# Patient Record
Sex: FEMALE | Race: WHITE | Employment: UNEMPLOYED | URBAN - METROPOLITAN AREA
[De-identification: names, ages, dates, MRNs, and addresses within clinical notes are randomized per-mention and may not be internally consistent; named-entity substitution may affect disease eponyms.]

---

## 2017-07-24 ENCOUNTER — APPOINTMENT (OUTPATIENT)
Dept: GENERAL RADIOLOGY | Age: 42
End: 2017-07-24
Attending: EMERGENCY MEDICINE
Payer: OTHER GOVERNMENT

## 2017-07-24 ENCOUNTER — HOSPITAL ENCOUNTER (OUTPATIENT)
Age: 42
Setting detail: OBSERVATION
Discharge: HOME | End: 2017-07-26
Attending: EMERGENCY MEDICINE | Admitting: HOSPITALIST
Payer: OTHER GOVERNMENT

## 2017-07-24 DIAGNOSIS — R07.2 PRECORDIAL PAIN: Primary | ICD-10-CM

## 2017-07-24 PROBLEM — I25.10 CAD (CORONARY ARTERY DISEASE): Status: ACTIVE | Noted: 2017-07-24

## 2017-07-24 PROBLEM — R07.9 CHEST PAIN: Status: ACTIVE | Noted: 2017-07-24

## 2017-07-24 LAB
ALBUMIN SERPL BCP-MCNC: 3.7 G/DL (ref 3.4–5)
ALBUMIN/GLOB SERPL: 1 {RATIO} (ref 0.8–1.7)
ALP SERPL-CCNC: 94 U/L (ref 45–117)
ALT SERPL-CCNC: 19 U/L (ref 13–56)
ANION GAP BLD CALC-SCNC: 11 MMOL/L (ref 3–18)
APPEARANCE UR: CLEAR
AST SERPL W P-5'-P-CCNC: 14 U/L (ref 15–37)
BASOPHILS # BLD AUTO: 0 K/UL (ref 0–0.06)
BASOPHILS # BLD: 0 % (ref 0–2)
BILIRUB SERPL-MCNC: 0.2 MG/DL (ref 0.2–1)
BILIRUB UR QL: NEGATIVE
BNP SERPL-MCNC: 49 PG/ML (ref 0–450)
BUN SERPL-MCNC: 8 MG/DL (ref 7–18)
BUN/CREAT SERPL: 11 (ref 12–20)
CALCIUM SERPL-MCNC: 8.7 MG/DL (ref 8.5–10.1)
CHLORIDE SERPL-SCNC: 102 MMOL/L (ref 100–108)
CK MB CFR SERPL CALC: NORMAL % (ref 0–4)
CK MB SERPL-MCNC: <1 NG/ML (ref 5–25)
CK SERPL-CCNC: 83 U/L (ref 26–192)
CO2 SERPL-SCNC: 25 MMOL/L (ref 21–32)
COLOR UR: YELLOW
CREAT SERPL-MCNC: 0.74 MG/DL (ref 0.6–1.3)
D DIMER PPP FEU-MCNC: <0.27 UG/ML(FEU)
DIFFERENTIAL METHOD BLD: ABNORMAL
EOSINOPHIL # BLD: 0.4 K/UL (ref 0–0.4)
EOSINOPHIL NFR BLD: 4 % (ref 0–5)
ERYTHROCYTE [DISTWIDTH] IN BLOOD BY AUTOMATED COUNT: 14.3 % (ref 11.6–14.5)
GLOBULIN SER CALC-MCNC: 3.6 G/DL (ref 2–4)
GLUCOSE SERPL-MCNC: 101 MG/DL (ref 74–99)
GLUCOSE UR STRIP.AUTO-MCNC: NEGATIVE MG/DL
HCT VFR BLD AUTO: 31.3 % (ref 35–45)
HGB BLD-MCNC: 10.2 G/DL (ref 12–16)
HGB UR QL STRIP: NEGATIVE
KETONES UR QL STRIP.AUTO: NEGATIVE MG/DL
LEUKOCYTE ESTERASE UR QL STRIP.AUTO: NEGATIVE
LIPASE SERPL-CCNC: 359 U/L (ref 73–393)
LYMPHOCYTES # BLD AUTO: 26 % (ref 21–52)
LYMPHOCYTES # BLD: 2.8 K/UL (ref 0.9–3.6)
MCH RBC QN AUTO: 27.9 PG (ref 24–34)
MCHC RBC AUTO-ENTMCNC: 32.6 G/DL (ref 31–37)
MCV RBC AUTO: 85.5 FL (ref 74–97)
MONOCYTES # BLD: 0.7 K/UL (ref 0.05–1.2)
MONOCYTES NFR BLD AUTO: 6 % (ref 3–10)
NEUTS SEG # BLD: 6.9 K/UL (ref 1.8–8)
NEUTS SEG NFR BLD AUTO: 64 % (ref 40–73)
NITRITE UR QL STRIP.AUTO: NEGATIVE
PH UR STRIP: 6 [PH] (ref 5–8)
PLATELET # BLD AUTO: 609 K/UL (ref 135–420)
PMV BLD AUTO: 9.3 FL (ref 9.2–11.8)
POTASSIUM SERPL-SCNC: 3.7 MMOL/L (ref 3.5–5.5)
PROT SERPL-MCNC: 7.3 G/DL (ref 6.4–8.2)
PROT UR STRIP-MCNC: NEGATIVE MG/DL
RBC # BLD AUTO: 3.66 M/UL (ref 4.2–5.3)
SODIUM SERPL-SCNC: 138 MMOL/L (ref 136–145)
SP GR UR REFRACTOMETRY: <1.005 (ref 1–1.03)
TROPONIN I BLD-MCNC: <0.04 NG/ML (ref 0–0.08)
TROPONIN I SERPL-MCNC: <0.02 NG/ML (ref 0–0.06)
UROBILINOGEN UR QL STRIP.AUTO: 0.2 EU/DL (ref 0.2–1)
WBC # BLD AUTO: 10.9 K/UL (ref 4.6–13.2)

## 2017-07-24 PROCEDURE — 82550 ASSAY OF CK (CPK): CPT | Performed by: EMERGENCY MEDICINE

## 2017-07-24 PROCEDURE — 83880 ASSAY OF NATRIURETIC PEPTIDE: CPT | Performed by: EMERGENCY MEDICINE

## 2017-07-24 PROCEDURE — 74011250636 HC RX REV CODE- 250/636: Performed by: EMERGENCY MEDICINE

## 2017-07-24 PROCEDURE — 84484 ASSAY OF TROPONIN QUANT: CPT | Performed by: EMERGENCY MEDICINE

## 2017-07-24 PROCEDURE — 99285 EMERGENCY DEPT VISIT HI MDM: CPT

## 2017-07-24 PROCEDURE — 80053 COMPREHEN METABOLIC PANEL: CPT | Performed by: EMERGENCY MEDICINE

## 2017-07-24 PROCEDURE — 83690 ASSAY OF LIPASE: CPT | Performed by: EMERGENCY MEDICINE

## 2017-07-24 PROCEDURE — 85379 FIBRIN DEGRADATION QUANT: CPT | Performed by: EMERGENCY MEDICINE

## 2017-07-24 PROCEDURE — 81025 URINE PREGNANCY TEST: CPT | Performed by: HOSPITALIST

## 2017-07-24 PROCEDURE — 81003 URINALYSIS AUTO W/O SCOPE: CPT | Performed by: EMERGENCY MEDICINE

## 2017-07-24 PROCEDURE — 71010 XR CHEST PORT: CPT

## 2017-07-24 PROCEDURE — 96374 THER/PROPH/DIAG INJ IV PUSH: CPT

## 2017-07-24 PROCEDURE — 93005 ELECTROCARDIOGRAM TRACING: CPT

## 2017-07-24 PROCEDURE — 80307 DRUG TEST PRSMV CHEM ANLYZR: CPT | Performed by: HOSPITALIST

## 2017-07-24 PROCEDURE — 74011250637 HC RX REV CODE- 250/637: Performed by: EMERGENCY MEDICINE

## 2017-07-24 PROCEDURE — 96372 THER/PROPH/DIAG INJ SC/IM: CPT

## 2017-07-24 PROCEDURE — 85025 COMPLETE CBC W/AUTO DIFF WBC: CPT | Performed by: EMERGENCY MEDICINE

## 2017-07-24 RX ORDER — NITROGLYCERIN 0.4 MG/1
0.4 TABLET SUBLINGUAL AS NEEDED
Status: DISCONTINUED | OUTPATIENT
Start: 2017-07-24 | End: 2017-07-26 | Stop reason: HOSPADM

## 2017-07-24 RX ORDER — GUAIFENESIN 100 MG/5ML
81 LIQUID (ML) ORAL DAILY
Status: DISCONTINUED | OUTPATIENT
Start: 2017-07-25 | End: 2017-07-26 | Stop reason: HOSPADM

## 2017-07-24 RX ORDER — HYDROXYZINE 25 MG/1
25 TABLET, FILM COATED ORAL ONCE
Status: COMPLETED | OUTPATIENT
Start: 2017-07-24 | End: 2017-07-24

## 2017-07-24 RX ORDER — ATORVASTATIN CALCIUM 20 MG/1
40 TABLET, FILM COATED ORAL DAILY
Status: DISCONTINUED | OUTPATIENT
Start: 2017-07-25 | End: 2017-07-26 | Stop reason: HOSPADM

## 2017-07-24 RX ORDER — CLOPIDOGREL BISULFATE 75 MG/1
75 TABLET ORAL DAILY
COMMUNITY

## 2017-07-24 RX ORDER — LEVOTHYROXINE SODIUM 125 UG/1
125 TABLET ORAL
COMMUNITY

## 2017-07-24 RX ORDER — SODIUM CHLORIDE 0.9 % (FLUSH) 0.9 %
5-10 SYRINGE (ML) INJECTION EVERY 8 HOURS
Status: DISCONTINUED | OUTPATIENT
Start: 2017-07-24 | End: 2017-07-26 | Stop reason: HOSPADM

## 2017-07-24 RX ORDER — ATORVASTATIN CALCIUM 40 MG/1
40 TABLET, FILM COATED ORAL DAILY
COMMUNITY

## 2017-07-24 RX ORDER — DILTIAZEM HYDROCHLORIDE 240 MG/1
240 CAPSULE, COATED, EXTENDED RELEASE ORAL DAILY
Status: DISCONTINUED | OUTPATIENT
Start: 2017-07-25 | End: 2017-07-26 | Stop reason: HOSPADM

## 2017-07-24 RX ORDER — HYDROXYZINE 25 MG/1
25 TABLET, FILM COATED ORAL
Status: DISCONTINUED | OUTPATIENT
Start: 2017-07-24 | End: 2017-07-26 | Stop reason: HOSPADM

## 2017-07-24 RX ORDER — ALBUTEROL SULFATE 90 UG/1
2 AEROSOL, METERED RESPIRATORY (INHALATION)
Status: DISCONTINUED | OUTPATIENT
Start: 2017-07-24 | End: 2017-07-26 | Stop reason: HOSPADM

## 2017-07-24 RX ORDER — BUPROPION HYDROCHLORIDE 100 MG/1
200 TABLET, EXTENDED RELEASE ORAL DAILY
Status: DISCONTINUED | OUTPATIENT
Start: 2017-07-25 | End: 2017-07-26 | Stop reason: HOSPADM

## 2017-07-24 RX ORDER — GUAIFENESIN 100 MG/5ML
81 LIQUID (ML) ORAL DAILY
COMMUNITY

## 2017-07-24 RX ORDER — FENTANYL CITRATE 50 UG/ML
100 INJECTION, SOLUTION INTRAMUSCULAR; INTRAVENOUS ONCE
Status: COMPLETED | OUTPATIENT
Start: 2017-07-24 | End: 2017-07-24

## 2017-07-24 RX ORDER — DILTIAZEM HYDROCHLORIDE 240 MG/1
240 CAPSULE, EXTENDED RELEASE ORAL DAILY
COMMUNITY

## 2017-07-24 RX ORDER — SODIUM CHLORIDE 0.9 % (FLUSH) 0.9 %
5-10 SYRINGE (ML) INJECTION AS NEEDED
Status: DISCONTINUED | OUTPATIENT
Start: 2017-07-24 | End: 2017-07-26 | Stop reason: HOSPADM

## 2017-07-24 RX ORDER — ENOXAPARIN SODIUM 100 MG/ML
70 INJECTION SUBCUTANEOUS EVERY 12 HOURS
Status: DISCONTINUED | OUTPATIENT
Start: 2017-07-24 | End: 2017-07-25

## 2017-07-24 RX ORDER — ALBUTEROL SULFATE 90 UG/1
2 AEROSOL, METERED RESPIRATORY (INHALATION)
COMMUNITY

## 2017-07-24 RX ORDER — LEVOTHYROXINE SODIUM 125 UG/1
125 TABLET ORAL
Status: DISCONTINUED | OUTPATIENT
Start: 2017-07-25 | End: 2017-07-26 | Stop reason: HOSPADM

## 2017-07-24 RX ORDER — FLUTICASONE PROPIONATE AND SALMETEROL 500; 50 UG/1; UG/1
1 POWDER RESPIRATORY (INHALATION) EVERY 12 HOURS
COMMUNITY
End: 2017-07-26

## 2017-07-24 RX ORDER — FLUTICASONE FUROATE AND VILANTEROL 100; 25 UG/1; UG/1
1 POWDER RESPIRATORY (INHALATION) DAILY
Status: DISCONTINUED | OUTPATIENT
Start: 2017-07-25 | End: 2017-07-26 | Stop reason: HOSPADM

## 2017-07-24 RX ORDER — BUPROPION HYDROCHLORIDE 200 MG/1
200 TABLET, EXTENDED RELEASE ORAL DAILY
COMMUNITY

## 2017-07-24 RX ORDER — HYDROXYZINE PAMOATE 25 MG/1
25 CAPSULE ORAL
COMMUNITY

## 2017-07-24 RX ORDER — MORPHINE SULFATE 2 MG/ML
1 INJECTION, SOLUTION INTRAMUSCULAR; INTRAVENOUS
Status: DISCONTINUED | OUTPATIENT
Start: 2017-07-24 | End: 2017-07-26 | Stop reason: HOSPADM

## 2017-07-24 RX ORDER — FLUTICASONE PROPIONATE AND SALMETEROL 100; 50 UG/1; UG/1
1 POWDER RESPIRATORY (INHALATION) EVERY 12 HOURS
COMMUNITY

## 2017-07-24 RX ORDER — CLOPIDOGREL BISULFATE 75 MG/1
75 TABLET ORAL DAILY
Status: DISCONTINUED | OUTPATIENT
Start: 2017-07-25 | End: 2017-07-26 | Stop reason: HOSPADM

## 2017-07-24 RX ADMIN — HYDROXYZINE HYDROCHLORIDE 25 MG: 25 TABLET, FILM COATED ORAL at 22:18

## 2017-07-24 RX ADMIN — ENOXAPARIN SODIUM 70 MG: 80 INJECTION SUBCUTANEOUS at 23:42

## 2017-07-24 RX ADMIN — NITROGLYCERIN 1 INCH: 20 OINTMENT TOPICAL at 23:43

## 2017-07-24 RX ADMIN — FENTANYL CITRATE 100 MCG: 50 INJECTION, SOLUTION INTRAMUSCULAR; INTRAVENOUS at 21:17

## 2017-07-24 NOTE — IP AVS SNAPSHOT
Summary of Care Report The Summary of Care report has been created to help improve care coordination. Users with access to just.me or [address] (Web-based application) may access additional patient information including the Discharge Summary. If you are not currently a [address] user and need more information, please call the number listed below in the [place] [address] section and ask to be connected with Medical Records. Facility Information Name Address Phone [address] [address] [phone] Patient Information Patient Name Sex  Dar Laurent ([id number]) Female 1975 Discharge Information Admitting Provider Service Area Unit Russel Rhodes MD / [id number] Card/Med / [phone] Discharge Provider Discharge Date/Time Discharge Disposition Destination (none) 2017 (Pending) AHR (none) Patient Language Language ENGLISH [13] Hospital Problems as of 2017  Never Reviewed Class Noted - Resolved Last Modified POA Active Problems Chest pain  2017 - Present 2017 by Florectia Serrano MD Unknown Entered by Florecita Serrano MD  
  CAD (coronary artery disease)  2017 - Present 2017 by Russel Rhodes MD Unknown Entered by Russel Rhodes MD  
  Anxiety  2017 - Present 2017 by Russel Rhodes MD Unknown Entered by Russel Rhodes MD  
  COPD (chronic obstructive pulmonary disease) ([facility] [address]  2017 - Present 2017 by Russel Rhodes MD Unknown Entered by Russel Rhodes MD  
  Anemia  2017 - Present 2017 by Russel Rhodes MD Unknown Entered by Russel Rhodes MD  
  
You are allergic to the following Allergen Reactions Lidocaine Hives Lopressor (Metoprolol Tartrate) Hives Pcn (Penicillins) Hives Current Discharge Medication List  
 CONTINUE these medications which have CHANGED Dose & Instructions Dispensing Information Comments ADVAIR DISKUS 100-50 mcg/dose diskus inhaler Generic drug:  fluticasone-salmeterol What changed:  Another medication with the same name was removed. Continue taking this medication, and follow the directions you see here. Dose:  1 Puff Take 1 Puff by inhalation every twelve (12) hours. Refills:  0 CONTINUE these medications which have NOT CHANGED Dose & Instructions Dispensing Information Comments AMBIEN 10 mg tablet Generic drug:  zolpidem Dose:  10 mg Take 10 mg by mouth nightly as needed for Sleep. Refills:  0  
   
 aspirin 81 mg chewable tablet Dose:  81 mg Take 81 mg by mouth daily. Indications: pt took 3 asa pta of ems this pm  
 Refills:  0  
   
 atorvastatin 40 mg tablet Commonly known as:  LIPITOR Dose:  40 mg Take 40 mg by mouth daily. Refills:  0  
   
 dilTIAZem  mg XR capsule Commonly known as:  DILACOR XR Dose:  240 mg Take 240 mg by mouth daily. Refills:  0 PLAVIX 75 mg Tab Generic drug:  clopidogrel Dose:  75 mg Take 75 mg by mouth daily. Refills:  0 PROAIR HFA 90 mcg/actuation inhaler Generic drug:  albuterol Dose:  2 Puff Take 2 Puffs by inhalation every four (4) hours as needed for Wheezing. Refills:  0  
   
 SYNTHROID 125 mcg tablet Generic drug:  levothyroxine Dose:  125 mcg Take 125 mcg by mouth Daily (before breakfast). Refills:  0  
   
 VISTARIL 25 mg capsule Generic drug:  hydrOXYzine pamoate Dose:  25 mg Take 25 mg by mouth three (3) times daily as needed for Itching. Refills:  0 WELLBUTRIN  mg SR tablet Generic drug:  buPROPion SR Dose:  200 mg Take 200 mg by mouth daily. Refills:  0 Follow-up Information Follow up With Details Comments Contact Info Patient prefers to schedule her own follow-up appointments. Stephon Lombardo MD In 1 day  Patient can only remember the practice name and not the physician 
  
 primary cardiology  In 3 days Discharge Instructions DISCHARGE SUMMARY from Nurse The following personal items are in your possession at time of discharge: 
 
Dental Appliances: None Visual Aid: None Home Medications: None Jewelry: Ring, With patient Clothing: Shirt, Pants, Footwear Other Valuables: Cell Phone Personal Items Sent to Safe: no PATIENT INSTRUCTIONS: 
 
What to do at Home: 
 
Diet: Heart Healthy (Cardiac diet) Recommended activity: Activity as tolerated 
 
 please follow up with primary care physician and Cardiology. *  Please give a list of your current medications to your Primary Care Provider. *  Please update this list whenever your medications are discontinued, doses are 
    changed, or new medications (including over-the-counter products) are added. *  Please carry medication information at all times in case of emergency situations. These are general instructions for a healthy lifestyle: No smoking/ No tobacco products/ Avoid exposure to second hand smoke Surgeon General's Warning:  Quitting smoking now greatly reduces serious risk to your health. Obesity, smoking, and sedentary lifestyle greatly increases your risk for illness A healthy diet, regular physical exercise & weight monitoring are important for maintaining a healthy lifestyle You may be retaining fluid if you have a history of heart failure or if you experience any of the following symptoms:  Weight gain of 3 pounds or more overnight or 5 pounds in a week, increased swelling in our hands or feet or shortness of breath while lying flat in bed. Please call your doctor as soon as you notice any of these symptoms; do not wait until your next office visit.  
 
Recognize signs and symptoms of STROKE: 
 
 F-face looks uneven A-arms unable to move or move unevenly S-speech slurred or non-existent T-time-call 911 as soon as signs and symptoms begin-DO NOT go Back to bed or wait to see if you get better-TIME IS BRAIN. Warning Signs of HEART ATTACK Call 911 if you have these symptoms: 
? Chest discomfort. Most heart attacks involve discomfort in the center of the chest that lasts more than a few minutes, or that goes away and comes back. It can feel like uncomfortable pressure, squeezing, fullness, or pain. ? Discomfort in other areas of the upper body. Symptoms can include pain or discomfort in one or both arms, the back, neck, jaw, or stomach. ? Shortness of breath with or without chest discomfort. ? Other signs may include breaking out in a cold sweat, nausea, or lightheadedness. Don't wait more than five minutes to call [address]! Fast action can save your life. Calling 911 is almost always the fastest way to get lifesaving treatment. Emergency Medical Services staff can begin treatment when they arrive  up to an hour sooner than if someone gets to the hospital by car. The discharge information has been reviewed with the patient. The patient verbalized understanding. Discharge medications reviewed with the patient and appropriate educational materials and side effects teaching were provided. Patient armband removed and shredded Chart Review Routing History No Routing History on File

## 2017-07-24 NOTE — IP AVS SNAPSHOT
Kearajar[facility] Allenruddy 
 
 
 [address] [facility] [address] 
[phone] Patient: Leatha Gamboa MRN: [id number] :1975 You are allergic to the following Allergen Reactions Lidocaine Hives Lopressor (Metoprolol Tartrate) Hives Pcn (Penicillins) Hives Recent Documentation Height Weight BMI Smoking Status 1.63 m 72 kg 27.1 kg/m2 Current Every Day Smoker Emergency Contacts Name Discharge Info Relation Home Work Mobile Jeff Ga DISCHARGE CAREGIVER [3] Spouse [3]   [phone] About your hospitalization You were admitted on:  2017 You last received care in the:  [address] [place] [address] You were discharged on:  2017 Unit phone number:  [phone] Why you were hospitalized Your primary diagnosis was:  Not on File Your diagnoses also included:  Chest Pain, Cad (Coronary Artery Disease), Anxiety, Copd (Chronic Obstructive Pulmonary Disease) ([facility]), Anemia Providers Seen During Your Hospitalizations Provider Role Specialty Primary office phone Nicki Goode MD Attending Provider Emergency Medicine [phone] Ba Navarro MD Attending Provider Internal Medicine [phone] Your Primary Care Physician (PCP) Primary Care Physician Office Phone Office Fax OTHER, PHYS ** None ** ** None ** Follow-up Information Follow up With Details Comments Contact Info Patient prefers to schedule her own follow-up appointments. Stephon Lombardo MD In 1 day  Patient can only remember the practice name and not the physician 
  
 primary cardiology  In 3 days Current Discharge Medication List  
  
CONTINUE these medications which have CHANGED Dose & Instructions Dispensing Information Comments Morning Noon Evening Bedtime ADVAIR DISKUS 100-50 mcg/dose diskus inhaler Generic drug:  fluticasone-salmeterol What changed:  Another medication with the same name was removed. Continue taking this medication, and follow the directions you see here. Your last dose was: Your next dose is:    
   
   
 Dose:  1 Puff Take 1 Puff by inhalation every twelve (12) hours. Refills:  0 CONTINUE these medications which have NOT CHANGED Dose & Instructions Dispensing Information Comments Morning Noon Evening Bedtime AMBIEN 10 mg tablet Generic drug:  zolpidem Your last dose was: Your next dose is:    
   
   
 Dose:  10 mg Take 10 mg by mouth nightly as needed for Sleep. Refills:  0  
     
   
   
   
  
 aspirin 81 mg chewable tablet Your last dose was: Your next dose is:    
   
   
 Dose:  81 mg Take 81 mg by mouth daily. Indications: pt took 3 asa pta of ems this pm  
 Refills:  0  
     
   
   
   
  
 atorvastatin 40 mg tablet Commonly known as:  LIPITOR Your last dose was: Your next dose is:    
   
   
 Dose:  40 mg Take 40 mg by mouth daily. Refills:  0  
     
   
   
   
  
 dilTIAZem  mg XR capsule Commonly known as:  DILACOR XR Your last dose was: Your next dose is:    
   
   
 Dose:  240 mg Take 240 mg by mouth daily. Refills:  0 PLAVIX 75 mg Tab Generic drug:  clopidogrel Your last dose was: Your next dose is:    
   
   
 Dose:  75 mg Take 75 mg by mouth daily. Refills:  0 PROAIR HFA 90 mcg/actuation inhaler Generic drug:  albuterol Your last dose was: Your next dose is:    
   
   
 Dose:  2 Puff Take 2 Puffs by inhalation every four (4) hours as needed for Wheezing. Refills:  0  
     
   
   
   
  
 SYNTHROID 125 mcg tablet Generic drug:  levothyroxine Your last dose was: Your next dose is:    
   
   
 Dose:  125 mcg Take 125 mcg by mouth Daily (before breakfast). Refills:  0  
     
   
   
   
  
 VISTARIL 25 mg capsule Generic drug:  hydrOXYzine pamoate Your last dose was: Your next dose is:    
   
   
 Dose:  25 mg Take 25 mg by mouth three (3) times daily as needed for Itching. Refills:  0 WELLBUTRIN  mg SR tablet Generic drug:  buPROPion SR Your last dose was: Your next dose is:    
   
   
 Dose:  200 mg Take 200 mg by mouth daily. Refills:  0 Discharge Instructions DISCHARGE SUMMARY from Nurse The following personal items are in your possession at time of discharge: 
 
Dental Appliances: None Visual Aid: None Home Medications: None Jewelry: Ring, With patient Clothing: Shirt, Pants, Footwear Other Valuables: Cell Phone Personal Items Sent to Safe: no PATIENT INSTRUCTIONS: 
 
What to do at Home: 
 
Diet: Heart Healthy (Cardiac diet) Recommended activity: Activity as tolerated 
 
 please follow up with primary care physician and Cardiology. *  Please give a list of your current medications to your Primary Care Provider. *  Please update this list whenever your medications are discontinued, doses are 
    changed, or new medications (including over-the-counter products) are added. *  Please carry medication information at all times in case of emergency situations. These are general instructions for a healthy lifestyle: No smoking/ No tobacco products/ Avoid exposure to second hand smoke Surgeon General's Warning:  Quitting smoking now greatly reduces serious risk to your health. Obesity, smoking, and sedentary lifestyle greatly increases your risk for illness A healthy diet, regular physical exercise & weight monitoring are important for maintaining a healthy lifestyle You may be retaining fluid if you have a history of heart failure or if you experience any of the following symptoms:  Weight gain of 3 pounds or more overnight or 5 pounds in a week, increased swelling in our hands or feet or shortness of breath while lying flat in bed. Please call your doctor as soon as you notice any of these symptoms; do not wait until your next office visit. Recognize signs and symptoms of STROKE: 
 
F-face looks uneven A-arms unable to move or move unevenly S-speech slurred or non-existent T-time-call 911 as soon as signs and symptoms begin-DO NOT go Back to bed or wait to see if you get better-TIME IS BRAIN. Warning Signs of HEART ATTACK Call 911 if you have these symptoms: 
? Chest discomfort. Most heart attacks involve discomfort in the center of the chest that lasts more than a few minutes, or that goes away and comes back. It can feel like uncomfortable pressure, squeezing, fullness, or pain. ? Discomfort in other areas of the upper body. Symptoms can include pain or discomfort in one or both arms, the back, neck, jaw, or stomach. ? Shortness of breath with or without chest discomfort. ? Other signs may include breaking out in a cold sweat, nausea, or lightheadedness. Don't wait more than five minutes to call [address]! Fast action can save your life. Calling 911 is almost always the fastest way to get lifesaving treatment. Emergency Medical Services staff can begin treatment when they arrive  up to an hour sooner than if someone gets to the hospital by car. The discharge information has been reviewed with the patient. The patient verbalized understanding. Discharge medications reviewed with the patient and appropriate educational materials and side effects teaching were provided. Patient armband removed and shredded Discharge Orders None MyChart Announcement  We are excited to announce that we are making your provider's discharge notes available to you in [facility]. You will see these notes when they are completed and signed by the physician that discharged you from your recent hospital stay. If you have any questions or concerns about any information you see in [facility], please call the Health Information Department where you were seen or reach out to your Primary Care Provider for more information about your plan of care. Introducing [facility]! Juli Cooney introduces [facility] patient portal. Now you can access parts of your medical record, email your doctor's office, and request medication refills online. 1. In your internet browser, go to [email or web address] 2. Click on the First Time User? Click Here link in the Sign In box. You will see the New Member Sign Up page. 3. Enter your [facility] Access Code exactly as it appears below. You will not need to use this code after youve completed the sign-up process. If you do not sign up before the expiration date, you must request a new code. · [facility] Access Code: [email or web address] Expires: 10/24/2017 11:06 AM 
 
4. Enter the last four digits of your Social Security Number (xxxx) and Date of Birth (mm/dd/yyyy) as indicated and click Submit. You will be taken to the next sign-up page. 5. Create a [facility] ID. This will be your [facility] login ID and cannot be changed, so think of one that is secure and easy to remember. 6. Create a [facility] password. You can change your password at any time. 7. Enter your Password Reset Question and Answer. This can be used at a later time if you forget your password. 8. Enter your e-mail address. You will receive e-mail notification when new information is available in [address] 9. Click Sign Up. You can now view and download portions of your medical record. 10. Click the Download Summary menu link to download a portable copy of your medical information. If you have questions, please visit the Frequently Asked Questions section of the MyChart website. Remember, MyChart is NOT to be used for urgent needs. For medical emergencies, dial 911. Now available from your iPhone and Android! General Information Please provide this summary of care documentation to your next provider. Patient Signature:  ____________________________________________________________ Date:  ____________________________________________________________  
  
Luis Noe Provider Signature:  ____________________________________________________________ Date:  ____________________________________________________________

## 2017-07-25 PROBLEM — D64.9 ANEMIA: Status: ACTIVE | Noted: 2017-07-25

## 2017-07-25 PROBLEM — F41.9 ANXIETY: Status: ACTIVE | Noted: 2017-07-25

## 2017-07-25 PROBLEM — J44.9 COPD (CHRONIC OBSTRUCTIVE PULMONARY DISEASE) (HCC): Status: ACTIVE | Noted: 2017-07-25

## 2017-07-25 LAB
AMPHET UR QL SCN: NEGATIVE
BARBITURATES UR QL SCN: NEGATIVE
BENZODIAZ UR QL: NEGATIVE
CANNABINOIDS UR QL SCN: NEGATIVE
CK MB CFR SERPL CALC: NORMAL % (ref 0–4)
CK MB SERPL-MCNC: <1 NG/ML (ref 5–25)
CK SERPL-CCNC: 64 U/L (ref 26–192)
CK SERPL-CCNC: 66 U/L (ref 26–192)
CK SERPL-CCNC: 76 U/L (ref 26–192)
COCAINE UR QL SCN: NEGATIVE
HCG UR QL: NEGATIVE
HDSCOM,HDSCOM: ABNORMAL
METHADONE UR QL: NEGATIVE
OPIATES UR QL: POSITIVE
PCP UR QL: NEGATIVE
TROPONIN I SERPL-MCNC: <0.02 NG/ML (ref 0–0.06)

## 2017-07-25 PROCEDURE — 93306 TTE W/DOPPLER COMPLETE: CPT

## 2017-07-25 PROCEDURE — 74011250637 HC RX REV CODE- 250/637: Performed by: HOSPITALIST

## 2017-07-25 PROCEDURE — 36415 COLL VENOUS BLD VENIPUNCTURE: CPT | Performed by: HOSPITALIST

## 2017-07-25 PROCEDURE — 96376 TX/PRO/DX INJ SAME DRUG ADON: CPT

## 2017-07-25 PROCEDURE — 74011250637 HC RX REV CODE- 250/637: Performed by: INTERNAL MEDICINE

## 2017-07-25 PROCEDURE — 96375 TX/PRO/DX INJ NEW DRUG ADDON: CPT

## 2017-07-25 PROCEDURE — 74011250636 HC RX REV CODE- 250/636: Performed by: HOSPITALIST

## 2017-07-25 PROCEDURE — 99218 HC RM OBSERVATION: CPT

## 2017-07-25 PROCEDURE — 82550 ASSAY OF CK (CPK): CPT | Performed by: HOSPITALIST

## 2017-07-25 PROCEDURE — 74011250636 HC RX REV CODE- 250/636: Performed by: EMERGENCY MEDICINE

## 2017-07-25 RX ORDER — PANTOPRAZOLE SODIUM 40 MG/1
40 TABLET, DELAYED RELEASE ORAL
Status: DISCONTINUED | OUTPATIENT
Start: 2017-07-25 | End: 2017-07-26 | Stop reason: HOSPADM

## 2017-07-25 RX ORDER — ONDANSETRON 2 MG/ML
INJECTION INTRAMUSCULAR; INTRAVENOUS
Status: DISPENSED
Start: 2017-07-25 | End: 2017-07-25

## 2017-07-25 RX ORDER — ZOLPIDEM TARTRATE 5 MG/1
10 TABLET ORAL
Status: DISCONTINUED | OUTPATIENT
Start: 2017-07-25 | End: 2017-07-26 | Stop reason: HOSPADM

## 2017-07-25 RX ORDER — MORPHINE SULFATE 10 MG/ML
8 INJECTION, SOLUTION INTRAMUSCULAR; INTRAVENOUS ONCE
Status: ACTIVE | OUTPATIENT
Start: 2017-07-25 | End: 2017-07-25

## 2017-07-25 RX ORDER — ACETAMINOPHEN 325 MG/1
650 TABLET ORAL
Status: DISCONTINUED | OUTPATIENT
Start: 2017-07-25 | End: 2017-07-26 | Stop reason: HOSPADM

## 2017-07-25 RX ORDER — ZOLPIDEM TARTRATE 10 MG/1
10 TABLET ORAL
COMMUNITY

## 2017-07-25 RX ORDER — ONDANSETRON 2 MG/ML
4 INJECTION INTRAMUSCULAR; INTRAVENOUS
Status: DISCONTINUED | OUTPATIENT
Start: 2017-07-25 | End: 2017-07-26 | Stop reason: HOSPADM

## 2017-07-25 RX ORDER — ONDANSETRON 2 MG/ML
4 INJECTION INTRAMUSCULAR; INTRAVENOUS ONCE
Status: COMPLETED | OUTPATIENT
Start: 2017-07-25 | End: 2017-07-25

## 2017-07-25 RX ADMIN — LEVOTHYROXINE SODIUM 125 MCG: 125 TABLET ORAL at 08:49

## 2017-07-25 RX ADMIN — MORPHINE SULFATE 1 MG: 2 INJECTION, SOLUTION INTRAMUSCULAR; INTRAVENOUS at 11:38

## 2017-07-25 RX ADMIN — CLOPIDOGREL BISULFATE 75 MG: 75 TABLET, FILM COATED ORAL at 08:48

## 2017-07-25 RX ADMIN — MORPHINE SULFATE 1 MG: 2 INJECTION, SOLUTION INTRAMUSCULAR; INTRAVENOUS at 00:52

## 2017-07-25 RX ADMIN — MORPHINE SULFATE 1 MG: 2 INJECTION, SOLUTION INTRAMUSCULAR; INTRAVENOUS at 08:29

## 2017-07-25 RX ADMIN — PANTOPRAZOLE SODIUM 40 MG: 40 TABLET, DELAYED RELEASE ORAL at 08:48

## 2017-07-25 RX ADMIN — ONDANSETRON 4 MG: 2 INJECTION INTRAMUSCULAR; INTRAVENOUS at 08:29

## 2017-07-25 RX ADMIN — Medication 10 ML: at 01:08

## 2017-07-25 RX ADMIN — FLUTICASONE FUROATE AND VILANTEROL TRIFENATATE 1 PUFF: 100; 25 POWDER RESPIRATORY (INHALATION) at 11:40

## 2017-07-25 RX ADMIN — BUPROPION HYDROCHLORIDE 200 MG: 100 TABLET, FILM COATED, EXTENDED RELEASE ORAL at 08:48

## 2017-07-25 RX ADMIN — MORPHINE SULFATE 1 MG: 2 INJECTION, SOLUTION INTRAMUSCULAR; INTRAVENOUS at 02:47

## 2017-07-25 RX ADMIN — ZOLPIDEM TARTRATE 10 MG: 5 TABLET ORAL at 21:04

## 2017-07-25 RX ADMIN — ATORVASTATIN CALCIUM 40 MG: 20 TABLET, FILM COATED ORAL at 21:04

## 2017-07-25 RX ADMIN — Medication 10 ML: at 21:04

## 2017-07-25 RX ADMIN — ACETAMINOPHEN 650 MG: 325 TABLET ORAL at 18:34

## 2017-07-25 RX ADMIN — ONDANSETRON 4 MG: 2 INJECTION INTRAMUSCULAR; INTRAVENOUS at 01:06

## 2017-07-25 RX ADMIN — ASPIRIN 81 MG 81 MG: 81 TABLET ORAL at 08:49

## 2017-07-25 RX ADMIN — DILTIAZEM HYDROCHLORIDE 240 MG: 240 CAPSULE, EXTENDED RELEASE ORAL at 08:48

## 2017-07-25 NOTE — H&P
History & Physical    Patient: Satish Freeman MRN: [id number]  CSN: [id number]    YOB: 1975  Age: 43 y.o. Sex: female      DOA: 7/24/2017  Primary Care Provider:  Stephon Lombardo MD      Assessment/Plan     Patient Active Problem List   Diagnosis Code    Chest pain R07.9    CAD (coronary artery disease) I25.10    Anxiety F41.9    COPD (chronic obstructive pulmonary disease) ([facility] [address] J44.9       Admit to tele for obs     1chest pain   Need to admit for obs to r/o acs   Dr. Evy Zambrano called per Dr. Calvin Bennett   Echo   Nuclear stress test  In April   lovenox and nitro paste   Morphine and O2 NC   2. Cad   Continue plavix and aspirin   Ca blocker   3. Copd stable   Continue breathing tx prn  4. Anxiety  Continue home meds   5 smoker   Refused nicotine patch   6 anemia   H/h 10/31.2   No transfusion needed      DVT : lovenox. ppi proph  CC: chest pain        HPI:     Satish Freeman is a 43 y.o. female who hx of cad with stent, anxiety, copd, smoker came to ed due to chest pain today. Started while lying on the bed, in the middle of chest, no radiation, taking nitro 2 times, not improved. Her ce wnl. ekg sr. She was given fentanyl for pain and pain improved. Dr. Evy Zambrano was called and recommend admit for obs. Denies any slurred speech/headache/n/v/blurred vission/d/c/palpitation/gait change/bleeding. She is a  Smoker. Visit Vitals    /71    Pulse 66    Temp 98.4 °F (36.9 °C)    Resp 18    Ht 5' 4.17\" (1.63 m)    Wt 72 kg (158 lb 11.7 oz)    SpO2 100%    BMI 27.1 kg/m2    O2 Flow Rate (L/min): 2 l/min O2 Device: Nasal cannula      Past Medical History:   Diagnosis Date    CAD (coronary artery disease)     Hypertension        Past Surgical History:   Procedure Laterality Date    CARDIAC SURG PROCEDURE UNLIST      STEMI with stents 2016       History reviewed. No pertinent family history.     Social History     Social History    Marital status:      Spouse name: N/A    Number of children: N/A    Years of education: N/A     Social History Main Topics    Smoking status: Current Every Day Smoker    Smokeless tobacco: None    Alcohol use None    Drug use: None    Sexual activity: Not Asked     Other Topics Concern    None     Social History Narrative    None       Prior to Admission medications    Medication Sig Start Date End Date Taking? Authorizing Provider   clopidogrel (PLAVIX) 75 mg tab Take 75 mg by mouth daily. Yes Stephon Lombardo MD   aspirin 81 mg chewable tablet Take 81 mg by mouth daily. Indications: pt took 3 asa pta of ems this pm   Yes Stephon Lombardo MD   atorvastatin (LIPITOR) 40 mg tablet Take 40 mg by mouth daily. Yes Stephon Lombardo MD   fluticasone-salmeterol (ADVAIR DISKUS) 100-50 mcg/dose diskus inhaler Take 1 Puff by inhalation every twelve (12) hours. Yes Stephon Lombardo MD   fluticasone-salmeterol (ADVAIR DISKUS) 500-50 mcg/dose diskus inhaler Take 1 Puff by inhalation every twelve (12) hours. Yes Stephon Lombardo MD   albuterol (PROAIR HFA) 90 mcg/actuation inhaler Take 2 Puffs by inhalation every four (4) hours as needed for Wheezing. Yes Stephon Lombardo MD   levothyroxine (SYNTHROID) 125 mcg tablet Take 125 mcg by mouth Daily (before breakfast). Yes Stephon Lombardo MD   dilTIAZem XR (DILACOR XR) 240 mg XR capsule Take 240 mg by mouth daily. Yes Stephon Lombardo MD   buPROPion SR (WELLBUTRIN SR) 200 mg SR tablet Take 200 mg by mouth daily. Yes Stephon Lombardo MD   hydrOXYzine pamoate (VISTARIL) 25 mg capsule Take 25 mg by mouth three (3) times daily as needed for Itching. Yes Stephon Lombardo MD       Allergies   Allergen Reactions    Lidocaine Hives    Lopressor [Metoprolol Tartrate] Hives    Pcn [Penicillins] Hives       Review of Systems  Gen: No fever, chills, malaise, weight loss/gain. Heent: No headache, rhinorrhea, epistaxis, ear pain, hearing loss, sinus pain, neck pain/stiffness, sore throat. Heart: chest pain, no palpitations, PEDROZA, pnd, or orthopnea.    Resp: No cough, hemoptysis, wheezing and shortness of breath. GI: No nausea, vomiting, diarrhea, constipation, melena or hematochezia. : No urinary obstruction, dysuria or hematuria. Derm: No rash, new skin lesion or pruritis. Musc/skeletal: no bone or joint complains. Vasc: No edema, cyanosis or claudication. Endo: No heat/cold intolerance, no polyuria,polydipsia or polyphagia. Neuro: No unilateral weakness, numbness, tingling. No seizures. Heme: No easy bruising or bleeding. Physical Exam:     Physical Exam:  Visit Vitals    /71    Pulse 66    Temp 98.4 °F (36.9 °C)    Resp 18    Ht 5' 4.17\" (1.63 m)    Wt 72 kg (158 lb 11.7 oz)    SpO2 100%    BMI 27.1 kg/m2    O2 Flow Rate (L/min): 2 l/min O2 Device: Nasal cannula    Temp (24hrs), Av.5 °F (36.9 °C), Min:98.4 °F (36.9 °C), Max:98.6 °F (37 °C)             General:  Awake, cooperative, no distress. Head:  Normocephalic, without obvious abnormality, atraumatic. Eyes:  Conjunctivae/corneas clear, sclera anicteric, PERRL, EOMs intact. Nose: Nares normal. No drainage or sinus tenderness. Throat: Lips, mucosa, and tongue normal. .   Neck: Supple, symmetrical, trachea midline, no adenopathy. Lungs:   Clear to auscultation bilaterally. Heart:  Regular rate and rhythm, S1, S2 normal, no murmur, click, rub or gallop. Abdomen: Soft, non-tender. Bowel sounds normal. No masses,  No organomegaly. Extremities: Extremities normal, atraumatic, no cyanosis or edema. Pulses: 2+ and symmetric all extremities. Skin: Skin color-pink, texture, turgor normal. No rashes or lesions. Capillary refill normal    Neurologic: CNII-XII intact. No focal motor or sensory deficit.        Labs Reviewed:    BMP:   Lab Results   Component Value Date/Time     2017 09:00 PM    K 3.7 2017 09:00 PM     2017 09:00 PM    CO2 25 2017 09:00 PM    AGAP 11 2017 09:00 PM     (H) 2017 09:00 PM BUN 8 07/24/2017 09:00 PM    CREA 0.74 07/24/2017 09:00 PM    GFRAA >60 07/24/2017 09:00 PM    GFRNA >60 07/24/2017 09:00 PM     CMP:   Lab Results   Component Value Date/Time     07/24/2017 09:00 PM    K 3.7 07/24/2017 09:00 PM     07/24/2017 09:00 PM    CO2 25 07/24/2017 09:00 PM    AGAP 11 07/24/2017 09:00 PM     (H) 07/24/2017 09:00 PM    BUN 8 07/24/2017 09:00 PM    CREA 0.74 07/24/2017 09:00 PM    GFRAA >60 07/24/2017 09:00 PM    GFRNA >60 07/24/2017 09:00 PM    CA 8.7 07/24/2017 09:00 PM    ALB 3.7 07/24/2017 09:00 PM    TP 7.3 07/24/2017 09:00 PM    GLOB 3.6 07/24/2017 09:00 PM    AGRAT 1.0 07/24/2017 09:00 PM    SGOT 14 (L) 07/24/2017 09:00 PM    ALT 19 07/24/2017 09:00 PM     CBC:   Lab Results   Component Value Date/Time    WBC 10.9 07/24/2017 09:00 PM    HGB 10.2 (L) 07/24/2017 09:00 PM    HCT 31.3 (L) 07/24/2017 09:00 PM     (H) 07/24/2017 09:00 PM     All Cardiac Markers in the last 24 hours:   Lab Results   Component Value Date/Time    CPK 83 07/24/2017 09:00 PM    CKMB <1.0 07/24/2017 09:00 PM    CKND1  07/24/2017 09:00 PM     CALCULATION NOT PERFORMED WHEN RESULT IS BELOW LINEAR LIMIT    TROIQ <0.02 07/24/2017 09:00 PM    TNIPOC <0.04 07/24/2017 09:16 PM     Recent Glucose Results:   Lab Results   Component Value Date/Time     (H) 07/24/2017 09:00 PM     ABG: No results found for: PH, PHI, PCO2, PCO2I, PO2, PO2I, HCO3, HCO3I, FIO2, FIO2I  COAGS: No results found for: APTT, PTP, INR  Liver Panel:   Lab Results   Component Value Date/Time    ALB 3.7 07/24/2017 09:00 PM    TP 7.3 07/24/2017 09:00 PM    GLOB 3.6 07/24/2017 09:00 PM    AGRAT 1.0 07/24/2017 09:00 PM    SGOT 14 (L) 07/24/2017 09:00 PM    ALT 19 07/24/2017 09:00 PM    AP 94 07/24/2017 09:00 PM     Pancreatic Markers:   Lab Results   Component Value Date/Time    LPSE 359 07/24/2017 09:00 PM       Procedures/imaging: see electronic medical records for all procedures/Xrays and details which were not copied into this note but were reviewed prior to creation of Donavan Blizzard, MD, Internal Medicine     CC: Stephon Lombardo MD

## 2017-07-25 NOTE — ED TRIAGE NOTES
Mid sternal CP radiating to back 1 hr ago while watching TV. NTG sl x3 at home without relief and ASA 81mg at home. EMS states gave NTG x1 sl,  mg, Morphine 4mg, and Zofran 4mg. Pain down to 6/10 from 10/10. Sepsis Screening completed    (  )Patient meets SIRS criteria. (xx  )Patient does not meet SIRS criteria.       SIRS Criteria is achieved when two or more of the following are present   Temperature < 96.8°F (36°C) or > 100.9°F (38.3°C)   Heart Rate > 90 beats per minute   Respiratory Rate > 20 breaths per minute   WBC count > 12,000 or <4,000 or > 10% bands

## 2017-07-25 NOTE — ED NOTES
EMS called to pt's home for reports dull/sharp MSCP - and throbbing neck pain radiating down R arm w/ tingling in L arm reported. Pt w/ hx MI 11/16  - stent placed then at [facility]. Pt took 3 of her own NTG and 3 asa PTA of EMS w/ little relief. EMS administered 4 mg MS w/ more relief en route to ED. Pt w/ + diaphoresis and selene w/ CP.

## 2017-07-25 NOTE — PROGRESS NOTES
Oral and Written notification given to patient and/or caregiver informing them that they are currently an Outpatient receiving care in our facility. Outpatient services include Observation Services under [address] and [facility] requirements.

## 2017-07-25 NOTE — CONSULTS
[address]    Name:  Yasmeen Loco  MR#:  [id number]  :  1975  Account #:  [de-identified]  Date of Adm:  2017  Date of Consultation:  2017      REASON FOR CONSULTATION: Chest pain. HISTORY OF PRESENT ILLNESS: Ms. Giancarlo Flanagan is a 80-year-old very  pleasant female who came to the hospital with recurrent precordial  chest pain radiating to the left neck and the pain factor is sharp and it  is reproducible musculoskeletal pain in the chest area and the pain  intensity and the factor is somewhat different as compared to the  patient had previous unstable angina symptoms. The patient has  significant stress in her life. The patient has history of posttraumatic  stress disorder. In the hospital, the patient's 3 sets of cardiac enzymes  are negative. Her echocardiogram is essentially normal without any  wall motion abnormality. Acute MI is ruled out. The patient was given  Lovenox 1 mg/kg body weight in the ER last night and the patient has  developed a small hematoma and bleeding at the site of Lovenox  injection. Otherwise, the patient is doing very well. Cardiac telemetry is  completely normal, without any significant single PVC. Denied fever,  cough or pleuritic chest pain. Denied any trauma to the chest. Denied  any acute swelling in the lower extremities and chest pain as well. PAST MEDICAL HISTORY: Significant for coronary artery disease,  status post mid LAD stenting in 2016, hyperlipidemia,  hypothyroidism. ALLERGIES:    1. .    2. LOPRESSOR. 3. PENICILLIN. MEDICATIONS INCLUDE:  1. Plavix 75 mg daily. 2. Aspirin 81 mg daily. 3. Lipitor 40 mg daily. 4. Advair Diskus 100/50. 5. ProAir HFA as needed. 6. Levothyroxine 125 mcg daily. 7. Diltiazem 240 mg daily. 8. Bupropion. 9. Wellbutrin- mg daily. 10. Hydroxyzine. 11. Vistaril 25 mg 3 times daily as needed.     REVIEW OF SYSTEMS  Positive for recent musculoskeletal pain in the chest. A 10-point review  of systems completed. Positive pertinent findings discussed in history  of present illness. The rest of the systems are normal.    SOCIAL HISTORY: Denied any active history of drug abuse. Smoking:  She continued to smoke. Drinks socially. Denied any drug abuse. PHYSICAL EXAMINATION    GENERAL: At the time of the consultation, a 59-year-old pleasant  female who is comfortable, not in any distress, not using any  accessory muscles of respiration. VITAL SIGNS: Temperature is 97.9, heart rate is 76 per minute,  respiration rate is 17, blood pressure is 98/49, pulse oximetry is  100%, weight is 72 kilograms. HEENT: Head is atraumatic, normocephalic. NECK: Supple. No JVD, no carotid bruits. HEART: S1, S2 audible. LUNGS: Bilateral air entry positive, no added sound. ABDOMEN: Soft, nontender. Bowel sounds audible. EXTREMITIES: No edema. Pulses are palpable. NEUROLOGIC: No focal motor or sensory deficit. DERMATOLOGIC: There is no skin rash. MUSCULOSKELETAL: No obvious joint deformity. The patient had  mild hematoma on the left abdominal wall at the site of Lovenox  without any active bleeding. EXTREMITIES: No edema. Pulses are palpable. NEUROLOGIC: No focal motor or sensory deficit. DERMATOLOGIC: No skin rash. MUSCULOSKELETAL: No obvious joint deformity. LABORATORY DATA: Three sets of troponins are negative. EKG normal sinus rhythm. Echocardiogram is essentially normal.    ASSESSMENT AND PLAN: Chest pain, acute coronary syndrome is  ruled out. I believe the etiology of chest pain is more  musculoskeletal and stress element.  The patient has a history of mid  LAD stent in November, 2016 and then the patient subsequently  continued to have some chest pain, underwent repeat coronary  angiography in March with patent stent with minimal luminal in-stent  restenosis and then in April, the patient underwent exercise nuclear  stress test in which the patient exercised for 13 minutes without any  chest pain, haf some EKG changes, but normal nuclear images and  with low EF on the nuclear stress test. Repeat echocardiogram after  that has shown normal ejection fraction, essentially normal  echocardiography as per Dr. Tiffanie Mcgrath note in the computer. At this point,  the patient cannot take Isosorbide and beta blocker, but the patient is  doing excellent on Cardizem, aspirin, Plavix and statins. I have  discussed in detail with the patient and  that the patient can be  discharged from the chest pain standpoint. Her symptoms are not  typical for angina and is more atypical symptoms and essentially  normal echocardiogram with normal EKG and normal cardiac markers  and there is no point repeating the stress test. The patient already had  a stress test in April also. The patient will be observed tonight because  of this small bleeding or hematoma in the anterior abdominal wall  secondary to Lovenox. If remains stable, she can be discharged  tomorrow home and follow up with Dr. Tiffanie Mcgrath in 1-2 weeks.         MD Ellen Saxena / [email or web address]  D:  07/25/2017   14:31  T:  07/25/2017   15:27  Job #:  [id number]

## 2017-07-25 NOTE — ED NOTES
Spoke to Moni Currie MD regarding patient's nausea. Moni Currie MD will place PRN orders for Zofran. Patient resting left lateral, in NAD. Visitor at bedside. Lights turned down for patient comfort. Side rails x2.

## 2017-07-25 NOTE — ADVANCED PRACTICE NURSE
13:55 Primary nurse reported area on abd that will not stop bleeding during IDRs. Left abdomen with hematoma. Dressing removed and was noted to be saturated with blood. Pressure held to left abdomen to achieve hemostasis. Surgifoam applied using sterile scissors and tegaderm applied. Dr. Toribio Lowe in at bedside and aware. 14:18 Left abdomen dressing clean and dry. Requested that the patient alert her nurse if she noticed any further bleeding. She verbalized understanding.

## 2017-07-25 NOTE — PROGRESS NOTES
Hospitalist Progress Note-critical care note     Patient: Pieter Meng MRN: [id number]  CSN: [id number]    YOB: 1975  Age: 43 y.o. Sex: female    DOA: 7/24/2017 LOS:  LOS: 0 days            Chief complaint:    Assessment/Plan         Patient Active Problem List   Diagnosis Code    Chest pain R07.9    CAD (coronary artery disease) I25.10    Anxiety F41.9    COPD (chronic obstructive pulmonary disease) ([address] J44.9    Anemia D64.9       1chest pain   Chest pain improved   ce negative   Dr. Mary Yeager called   Echo   Nuclear stress test  In April   lovenox and nitro paste   Morphine and O2 NC   2. Cad   Continue plavix and aspirin   Ca blocker ,allergic to bbb   3. Copd stable   Continue breathing tx prn  4. Anxiety  Continue home meds   5 smoker   Refused nicotine patch   6 anemia   H/h 10/31.2   Will continue to monitor     Subjective : feel better, no chest pain now     Review of systems:    General: No fevers or chills. Cardiovascular: No chest pain or pressure. No palpitations. Pulmonary: No shortness of breath. Gastrointestinal: No nausea, vomiting. Vital signs/Intake and Output:  Visit Vitals    BP 98/53 (BP 1 Location: Right arm, BP Patient Position: At rest)    Pulse 72    Temp 97.9 °F (36.6 °C)    Resp 17    Ht 5' 4.17\" (1.63 m)    Wt 72 kg (158 lb 11.7 oz)    SpO2 98%    BMI 27.1 kg/m2     Current Shift:     Last three shifts:       Physical Exam:  General: WD, WN. Alert, cooperative, no acute distress    HEENT: NC, Atraumatic. PERRLA, anicteric sclerae. Lungs: CTA Bilaterally. No Wheezing/Rhonchi/Rales. Heart:  Regular  rhythm,  No murmur, No Rubs, No Gallops  Abdomen: Soft, Non distended, Non tender.  +Bowel sounds,   Extremities: No c/c/e  Psych:   Not anxious or agitated. Neurologic:  No acute neurological deficit.              Labs: Results:       Chemistry Recent Labs      07/24/17   2100   GLU  101*   NA  138   K  3.7   CL  102   CO2  25   BUN  8   CREA 0. 74   CA  8.7   AGAP  11   BUCR  11*   AP  94   TP  7.3   ALB  3.7   GLOB  3.6   AGRAT  1.0      CBC w/Diff Recent Labs      07/24/17   2100   WBC  10.9   RBC  3.66*   HGB  10.2*   HCT  31.3*   PLT  609*   GRANS  64   LYMPH  26   EOS  4      Cardiac Enzymes Recent Labs      07/25/17   0653  07/25/17   0022   CPK  66  76   CKND1  CALCULATION NOT PERFORMED WHEN RESULT IS BELOW LINEAR LIMIT  CALCULATION NOT PERFORMED WHEN RESULT IS BELOW LINEAR LIMIT      Coagulation No results for input(s): PTP, INR, APTT in the last 72 hours. No lab exists for component: INREXT    Lipid Panel No results found for: CHOL, CHOLPOCT, CHOLX, CHLST, CHOLV, 996930, HDL, LDL, LDLC, DLDLP, 156962, VLDLC, VLDL, TGLX, TRIGL, TRIGP, TGLPOCT, CHHD, CHHDX   BNP No results for input(s): BNPP in the last 72 hours.    Liver Enzymes Recent Labs      07/24/17   2100   TP  7.3   ALB  3.7   AP  94   SGOT  14*      Thyroid Studies No results found for: T4, T3U, TSH, TSHEXT     Procedures/imaging: see electronic medical records for all procedures/Xrays and details which were not copied into this note but were reviewed prior to creation of Miguel Chavez MD

## 2017-07-25 NOTE — PROGRESS NOTES
0740 Assumed care of patient from [address] [place] [address]. 9904 Morphine 1 mg iv push prn given for complaints of mid chest pain. See doc-flow sheet for follow up. 1138 Morphine 1 mg iv push prn given for complaints of mid chest pain. See doc-flow sheet for follow up.    1330 Interdisciplinary team rounds were held 7/25/2017 with the following team members:Care Management, Nursing, Pharmacy, Physical Therapy and Clinical Coordinator and the patient. Plan of care discussed. See clinical pathway and/or care plan for interventions and desired outcomes. Awaiting Cardiology's input. 1834 Tylenol 650 mg po prn given for complaints of headache. See doc-flow sheet for follow up. Monitor left lower abdomen hematoma due to lovenox injection. Dressing clean dry and intact, no signs of any more oozing or bleeding.

## 2017-07-25 NOTE — PROGRESS NOTES
Met with patient at bedside during IDR    Patient states she very independent and has no needs at this time. CM will continue to follow  Readmission Risk Assessment: Low Risk and MSSP/Good Help ACO patients    RRAT Score: 1 - 12    Initial Assessment:  presents to the emergency department via EMS C/O dull, sharp, throbbing neck pain radiating to arm and neck onset PTA while sitting on her couch. Patient admitted for medical management of chest pain    Emergency Contact: see face sheet    Pertinent Medical Hx: CAD, HTN    PCP/Specialists:Goldie Ruiz    Community Services:     DME:     Low Risk Care Transition Plan:  1. Evaluate for [facility] or [address] coordination of resources  2. Involve patient/caregiver in assessment, planning, education and implement of intervention. 3. CM daily patient care huddles/interdisciplinary rounds. 4. PCP/Specialist appointment within 7 - 10 days made prior to discharge. 5. Facilitate transportation and logistics for follow-up appointments. 6. Handoff to [address] Nurse Navigator or PCP practice    Care Management Interventions  Transition of Care Consult (CM Consult): Discharge Planning     Care manager available and will assist with safe transition of care.

## 2017-07-25 NOTE — PROGRESS NOTES
0235:  Pt on floor transported by ED personnel via wheelchair. Family at bedside. Pt complaint of 8/10 mid-sternal chest pain. 1 mg morphine IV given. Pt had square mepilex to LL abdomen covering lovenox injection site. Mepilex saturated and changed; gown bloody and changed. Held pressure to site and encouraged Pt to do so. 0315:  Pt resting in bed with eyes closed. Breathing even and unlabored. No s/s of distress of any kind. Call bell within reach. 0615:  Mepilex at LL abdomen covering lovenox injection site and gown saturated again with blood. Mepilex and gown changed. Held pressure to site and encouraged Pt to do so. Bedside and Verbal shift change report given to Krystina Hargrove RN (oncoming nurse) by Zak Sheffield RN (offgoing nurse).  Report included the following information SBAR, Kardex, Intake/Output, MAR, Recent Results, Med Rec Status and Cardiac Rhythm SR.

## 2017-07-25 NOTE — PROGRESS NOTES
conducted an initial consultation and Spiritual Assessment for Elliott Truong, who is a 43 y.o.,female. Patients Primary Language is: [place]. According to the patients EMR Tenriism Affiliation is: Chase Keene.     The reason the Patient came to the hospital is:   Patient Active Problem List    Diagnosis Date Noted    Anxiety 07/25/2017    COPD (chronic obstructive pulmonary disease) ([facility] [address] 07/25/2017    Anemia 07/25/2017    Chest pain 07/24/2017    CAD (coronary artery disease) 07/24/2017        The  provided the following Interventions:  Initiated a relationship of care and support. Listened empathically. Provided chaplaincy education. Provided information about Spiritual Care Services. Offered assurance of continued prayers on patient and family's behalf. Chart reviewed. The following outcomes were achieved:  Patient expressed gratitude for the 's visit. Assessment:  Patient did not indicate any spiritual or Rastafari issues which require Spiritual Care Services interventions at this time. Patient does not have any Rastafari/cultural needs that will affect patients preferences in health care. Plan:  Chaplains will continue to follow and will provide pastoral care on an as needed or requested basis.  recommends bedside caregivers page  on duty if patient shows signs of acute spiritual or emotional distress. Thuan Baird [employer] Intern  [phone]

## 2017-07-25 NOTE — ROUTINE PROCESS
Bedside and Verbal shift change report given to ALEX Price (oncoming nurse) by Devyn (offgoing nurse). Report included the following information SBAR, Kardex, Intake/Output, MAR, Recent Results and Cardiac Rhythm NSR.

## 2017-07-25 NOTE — ED NOTES
Phone call to lab regarding new orders for UDS and HCG QL; lab will run tests. Patient questioning this RN if she can go home. Patient has removed oxygen. Patient states she tired, has a headache, and chest pain is unchanged. Patient informed she has orders for pain medications and this RN will administer pain medication. Patient states she will try pain medication. Charge RN aware of patient's requests.
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
## 2017-07-25 NOTE — ROUTINE PROCESS
TRANSFER - OUT REPORT:    Verbal report given to Yecenia Calle RN (name) on Hermann Cheatham  being transferred to Telemetry (unit) for routine progression of care       Report consisted of patients Situation, Background, Assessment and   Recommendations(SBAR). Information from the following report(s) SBAR, ED Summary, Intake/Output, MAR, Recent Results and Cardiac Rhythm NSR was reviewed with the receiving nurse. Lines:   Peripheral IV 07/24/17 Left Antecubital (Active)   Site Assessment Clean, dry, & intact 7/24/2017  9:08 PM   Phlebitis Assessment 0 7/24/2017  9:08 PM   Dressing Status Clean, dry, & intact 7/24/2017  9:08 PM   Dressing Type Tape;Transparent 7/24/2017  9:08 PM   Hub Color/Line Status Green;Flushed 7/24/2017  9:08 PM   Action Taken Blood drawn 7/24/2017  9:08 PM   Alcohol Cap Used No 7/24/2017  9:08 PM        Opportunity for questions and clarification was provided.       Patient transported with:   Monitor  Tech

## 2017-07-26 VITALS
SYSTOLIC BLOOD PRESSURE: 104 MMHG | HEART RATE: 69 BPM | BODY MASS INDEX: 27.1 KG/M2 | HEIGHT: 64 IN | RESPIRATION RATE: 17 BRPM | DIASTOLIC BLOOD PRESSURE: 53 MMHG | WEIGHT: 158.73 LBS | TEMPERATURE: 97.9 F | OXYGEN SATURATION: 100 %

## 2017-07-26 LAB
ANION GAP BLD CALC-SCNC: 9 MMOL/L (ref 3–18)
BASOPHILS # BLD AUTO: 0 K/UL (ref 0–0.06)
BASOPHILS # BLD: 0 % (ref 0–2)
BUN SERPL-MCNC: 7 MG/DL (ref 7–18)
BUN/CREAT SERPL: 10 (ref 12–20)
CALCIUM SERPL-MCNC: 8.3 MG/DL (ref 8.5–10.1)
CHLORIDE SERPL-SCNC: 104 MMOL/L (ref 100–108)
CO2 SERPL-SCNC: 25 MMOL/L (ref 21–32)
CREAT SERPL-MCNC: 0.73 MG/DL (ref 0.6–1.3)
DIFFERENTIAL METHOD BLD: ABNORMAL
EOSINOPHIL # BLD: 0.3 K/UL (ref 0–0.4)
EOSINOPHIL NFR BLD: 4 % (ref 0–5)
ERYTHROCYTE [DISTWIDTH] IN BLOOD BY AUTOMATED COUNT: 14.4 % (ref 11.6–14.5)
GLUCOSE SERPL-MCNC: 96 MG/DL (ref 74–99)
HCT VFR BLD AUTO: 30 % (ref 35–45)
HGB BLD-MCNC: 9.6 G/DL (ref 12–16)
LYMPHOCYTES # BLD AUTO: 23 % (ref 21–52)
LYMPHOCYTES # BLD: 1.5 K/UL (ref 0.9–3.6)
MCH RBC QN AUTO: 27.6 PG (ref 24–34)
MCHC RBC AUTO-ENTMCNC: 32 G/DL (ref 31–37)
MCV RBC AUTO: 86.2 FL (ref 74–97)
MONOCYTES # BLD: 0.5 K/UL (ref 0.05–1.2)
MONOCYTES NFR BLD AUTO: 7 % (ref 3–10)
NEUTS SEG # BLD: 4.2 K/UL (ref 1.8–8)
NEUTS SEG NFR BLD AUTO: 66 % (ref 40–73)
PLATELET # BLD AUTO: 451 K/UL (ref 135–420)
PMV BLD AUTO: 9.4 FL (ref 9.2–11.8)
POTASSIUM SERPL-SCNC: 4 MMOL/L (ref 3.5–5.5)
RBC # BLD AUTO: 3.48 M/UL (ref 4.2–5.3)
SODIUM SERPL-SCNC: 138 MMOL/L (ref 136–145)
WBC # BLD AUTO: 6.5 K/UL (ref 4.6–13.2)

## 2017-07-26 PROCEDURE — 74011250637 HC RX REV CODE- 250/637: Performed by: HOSPITALIST

## 2017-07-26 PROCEDURE — 96376 TX/PRO/DX INJ SAME DRUG ADON: CPT

## 2017-07-26 PROCEDURE — 85025 COMPLETE CBC W/AUTO DIFF WBC: CPT | Performed by: HOSPITALIST

## 2017-07-26 PROCEDURE — 99218 HC RM OBSERVATION: CPT

## 2017-07-26 PROCEDURE — 36415 COLL VENOUS BLD VENIPUNCTURE: CPT | Performed by: HOSPITALIST

## 2017-07-26 PROCEDURE — 80048 BASIC METABOLIC PNL TOTAL CA: CPT | Performed by: HOSPITALIST

## 2017-07-26 PROCEDURE — 74011250636 HC RX REV CODE- 250/636: Performed by: HOSPITALIST

## 2017-07-26 RX ADMIN — LEVOTHYROXINE SODIUM 125 MCG: 125 TABLET ORAL at 07:16

## 2017-07-26 RX ADMIN — CLOPIDOGREL BISULFATE 75 MG: 75 TABLET, FILM COATED ORAL at 09:22

## 2017-07-26 RX ADMIN — MORPHINE SULFATE 1 MG: 2 INJECTION, SOLUTION INTRAMUSCULAR; INTRAVENOUS at 09:21

## 2017-07-26 RX ADMIN — FLUTICASONE FUROATE AND VILANTEROL TRIFENATATE 1 PUFF: 100; 25 POWDER RESPIRATORY (INHALATION) at 09:26

## 2017-07-26 RX ADMIN — BUPROPION HYDROCHLORIDE 200 MG: 100 TABLET, FILM COATED, EXTENDED RELEASE ORAL at 09:22

## 2017-07-26 RX ADMIN — ASPIRIN 81 MG 81 MG: 81 TABLET ORAL at 09:22

## 2017-07-26 RX ADMIN — Medication 10 ML: at 05:51

## 2017-07-26 RX ADMIN — ACETAMINOPHEN 650 MG: 325 TABLET ORAL at 09:21

## 2017-07-26 RX ADMIN — DILTIAZEM HYDROCHLORIDE 240 MG: 240 CAPSULE, EXTENDED RELEASE ORAL at 09:22

## 2017-07-26 RX ADMIN — PANTOPRAZOLE SODIUM 40 MG: 40 TABLET, DELAYED RELEASE ORAL at 07:16

## 2017-07-26 NOTE — PROGRESS NOTES
2000:  Assessment completed. Patient requesting to have her nighttime medications now as she just wants to go ahead to bed for the night. Requesting Ambien which is not currently ordered. Will call MD for orders. 2104:  HS medications given per patient request.  No complaints of pain. Apple juice given per patient request.  No further needs stated. 2235:  Rounds completed. Patient resting quietly with no need stated. 2320:  Verbal shift change report given to WENDY Dawn RN (oncoming nurse) by ALEX Price RN (offgoing nurse). Report included the following information SBAR, Kardex, Intake/Output, MAR and Recent Results.

## 2017-07-26 NOTE — PROGRESS NOTES
0800 Assumed care of patient from [place] [id number]. Patient awake watching T.V., no signs of distress, complaining of a headache and mid sternal chest pain, call bell within reach. Will contact md for prn.

## 2017-07-26 NOTE — PROGRESS NOTES
2330- Report received from [address]. Patient is in bed resting with eyes closed. No noted distress. Call bell within reach. 0200- Resting in bed.     0400- Patient given water. Denies pain at this time. 1866- Patient up to go to restroom, denies pain. Ambulating without difficulty. Bedside shift change report given to Flako GALARZA  (oncoming nurse) by Jagruti Busch RN (offgoing nurse). Report included the following information Kardex, Procedure Summary, Intake/Output and Recent Results.

## 2017-07-26 NOTE — DISCHARGE SUMMARY
Discharge Summary    Patient: Stepan Elliott MRN: [id number]  CSN: [id number]    YOB: 1975  Age: 43 y.o. Sex: female    DOA: 7/24/2017 LOS:  LOS: 0 days   Discharge Date:      Primary Care Provider:  Stephon Lombardo MD    Admission Diagnoses: Chest pain    Discharge Diagnoses:    Patient Active Problem List   Diagnosis Code    Chest pain R07.9    CAD (coronary artery disease) I25.10    Anxiety F41.9    COPD (chronic obstructive pulmonary disease) ([facility] [address] J44.9    Anemia D64.9       Discharge Condition: Stable    Discharge Medications:     Current Discharge Medication List      CONTINUE these medications which have NOT CHANGED    Details   zolpidem (AMBIEN) 10 mg tablet Take 10 mg by mouth nightly as needed for Sleep. clopidogrel (PLAVIX) 75 mg tab Take 75 mg by mouth daily. aspirin 81 mg chewable tablet Take 81 mg by mouth daily. Indications: pt took 3 asa pta of ems this pm      atorvastatin (LIPITOR) 40 mg tablet Take 40 mg by mouth daily. fluticasone-salmeterol (ADVAIR DISKUS) 100-50 mcg/dose diskus inhaler Take 1 Puff by inhalation every twelve (12) hours. albuterol (PROAIR HFA) 90 mcg/actuation inhaler Take 2 Puffs by inhalation every four (4) hours as needed for Wheezing. levothyroxine (SYNTHROID) 125 mcg tablet Take 125 mcg by mouth Daily (before breakfast). dilTIAZem XR (DILACOR XR) 240 mg XR capsule Take 240 mg by mouth daily. buPROPion SR (WELLBUTRIN SR) 200 mg SR tablet Take 200 mg by mouth daily. hydrOXYzine pamoate (VISTARIL) 25 mg capsule Take 25 mg by mouth three (3) times daily as needed for Itching.          STOP taking these medications       fluticasone-salmeterol (ADVAIR DISKUS) 500-50 mcg/dose diskus inhaler Comments:   Reason for Stopping:               Procedures : none     Consults: Cardiology      PHYSICAL EXAM   Visit Vitals    /53 (BP 1 Location: Right arm, BP Patient Position: At rest)    Pulse 69    Temp 97.9 °F (36.6 °C)    Resp 17    Ht 5' 4.17\" (1.63 m)    Wt 72 kg (158 lb 11.7 oz)    SpO2 100%    BMI 27.1 kg/m2     General: Awake, cooperative, no acute distress    HEENT: NC, Atraumatic. PERRLA, EOMI. Anicteric sclerae. Lungs:  CTA Bilaterally. No Wheezing/Rhonchi/Rales. Heart:  Regular  rhythm,  No murmur, No Rubs, No Gallops  Abdomen: Soft, Non distended, Non tender. +Bowel sounds,   Extremities: No c/c/e  Psych:   Not anxious or agitated. Neurologic:  No acute neurological deficits. Admission HPI :   Christy Anaya is a 43 y.o. female who hx of cad with stent, anxiety, copd, smoker came to ed due to chest pain today. Started while lying on the bed, in the middle of chest, no radiation, taking nitro 2 times, not improved. Her ce wnl. ekg sr. She was given fentanyl for pain and pain improved. Dr. Narda Egan was called and recommend admit for obs.      Denies any slurred speech/headache/n/v/blurred vission/d/c/palpitation/gait change/bleeding. She is a  Smoker. Hospital Course :   Since she was admitted, lovenox was given for possible acs. ce negative for three sets. Cardiology was on board. Echo was wnl. She was cleared per cardiology and no intervention needed. We continued her home medication for anxiety and depression and copd.      Activity: Activity as tolerated    Diet: Cardiac Diet    Follow-up: pcm and primary cardiology     Disposition: home     Minutes spent on discharge: 60 min       Labs: Results:       Chemistry Recent Labs      07/26/17   0445 07/24/17   2100   GLU  96  101*   NA  138  138   K  4.0  3.7   CL  104  102   CO2  25  25   BUN  7  8   CREA  0.73  0.74   CA  8.3*  8.7   AGAP  9  11   BUCR  10*  11*   AP   --   94   TP   --   7.3   ALB   --   3.7   GLOB   --   3.6   AGRAT   --   1.0      CBC w/Diff Recent Labs      07/26/17 0445 07/24/17   2100   WBC  6.5  10.9   RBC  3.48*  3.66*   HGB  9.6*  10.2*   HCT  30.0*  31.3*   PLT  451*  609*   GRANS  66  41 LYMPH  23  26   EOS  4  4      Cardiac Enzymes Recent Labs      07/25/17   1145  07/25/17   0653   CPK  64  66   CKND1  CALCULATION NOT PERFORMED WHEN RESULT IS BELOW LINEAR LIMIT  CALCULATION NOT PERFORMED WHEN RESULT IS BELOW LINEAR LIMIT      Coagulation No results for input(s): PTP, INR, APTT in the last 72 hours. No lab exists for component: INREXT    Lipid Panel No results found for: CHOL, CHOLPOCT, CHOLX, CHLST, CHOLV, 108861, HDL, LDL, LDLC, DLDLP, 135810, VLDLC, VLDL, TGLX, TRIGL, TRIGP, TGLPOCT, CHHD, CHHDX   BNP No results for input(s): BNPP in the last 72 hours. Liver Enzymes Recent Labs      07/24/17   2100   TP  7.3   ALB  3.7   AP  94   SGOT  14*      Thyroid Studies No results found for: T4, T3U, TSH, TSHEXT         Significant Diagnostic Studies: Xr Chest Port    Result Date: 7/25/2017  Indication:  Chest pain Comparison:  None Time of study - 2121 Findings:  AP erect portable chest The cardiomediastinal silhouette is normal.  The lungs are clear. The bony structures appear unremarkable.      IMPRESSION: Negative portable chest.            [facility]     CC: Stephon Lombardo MD

## 2017-07-26 NOTE — DISCHARGE INSTRUCTIONS
DISCHARGE SUMMARY from Nurse    The following personal items are in your possession at time of discharge:    Dental Appliances: None  Visual Aid: None     Home Medications: None  Jewelry: Ring, With patient  Clothing: Shirt, Pants, Footwear  Other Valuables: Cell Phone  Personal Items Sent to Safe: no          PATIENT INSTRUCTIONS:    What to do at Home:    Diet: Heart Healthy (Cardiac diet)    Recommended activity: Activity as tolerated     please follow up with primary care physician and Cardiology. *  Please give a list of your current medications to your Primary Care Provider. *  Please update this list whenever your medications are discontinued, doses are      changed, or new medications (including over-the-counter products) are added. *  Please carry medication information at all times in case of emergency situations. These are general instructions for a healthy lifestyle:    No smoking/ No tobacco products/ Avoid exposure to second hand smoke    Surgeon General's Warning:  Quitting smoking now greatly reduces serious risk to your health. Obesity, smoking, and sedentary lifestyle greatly increases your risk for illness    A healthy diet, regular physical exercise & weight monitoring are important for maintaining a healthy lifestyle    You may be retaining fluid if you have a history of heart failure or if you experience any of the following symptoms:  Weight gain of 3 pounds or more overnight or 5 pounds in a week, increased swelling in our hands or feet or shortness of breath while lying flat in bed. Please call your doctor as soon as you notice any of these symptoms; do not wait until your next office visit.     Recognize signs and symptoms of STROKE:    F-face looks uneven    A-arms unable to move or move unevenly    S-speech slurred or non-existent    T-time-call 911 as soon as signs and symptoms begin-DO NOT go       Back to bed or wait to see if you get better-TIME IS BRAIN. Warning Signs of HEART ATTACK     Call 911 if you have these symptoms:   Chest discomfort. Most heart attacks involve discomfort in the center of the chest that lasts more than a few minutes, or that goes away and comes back. It can feel like uncomfortable pressure, squeezing, fullness, or pain.  Discomfort in other areas of the upper body. Symptoms can include pain or discomfort in one or both arms, the back, neck, jaw, or stomach.  Shortness of breath with or without chest discomfort.  Other signs may include breaking out in a cold sweat, nausea, or lightheadedness. Don't wait more than five minutes to call 911 - MINUTES MATTER! Fast action can save your life. Calling 911 is almost always the fastest way to get lifesaving treatment. Emergency Medical Services staff can begin treatment when they arrive -- up to an hour sooner than if someone gets to the hospital by car. The discharge information has been reviewed with the patient. The patient verbalized understanding. Discharge medications reviewed with the patient and appropriate educational materials and side effects teaching were provided.     Patient armband removed and shredded

## 2017-07-26 NOTE — ROUTINE PROCESS
Dual AVS reviewed with BK. [address] [place]. All medications reviewed individually with patient. Opportunities for questions and concerns provided. Patient discharged via (mode of transport ie. Car, ambulance or air transport) car with family. Patient's arm band appropriately discarded.

## 2017-07-30 LAB
ATRIAL RATE: 89 BPM
CALCULATED P AXIS, ECG09: 78 DEGREES
CALCULATED R AXIS, ECG10: 64 DEGREES
CALCULATED T AXIS, ECG11: 66 DEGREES
DIAGNOSIS, 93000: NORMAL
P-R INTERVAL, ECG05: 150 MS
Q-T INTERVAL, ECG07: 392 MS
QRS DURATION, ECG06: 80 MS
QTC CALCULATION (BEZET), ECG08: 463 MS
VENTRICULAR RATE, ECG03: 84 BPM

## 2017-08-03 ENCOUNTER — HOSPITAL ENCOUNTER (OUTPATIENT)
Dept: ULTRASOUND IMAGING | Age: 42
Discharge: HOME | End: 2017-08-03

## 2017-08-03 DIAGNOSIS — N92.1 MENOMETRORRHAGIA: ICD-10-CM

## 2017-08-03 DIAGNOSIS — R10.11 RUQ PAIN: ICD-10-CM

## 2017-08-03 DIAGNOSIS — K21.9 GERD (GASTROESOPHAGEAL REFLUX DISEASE): ICD-10-CM

## 2017-08-10 ENCOUNTER — HOSPITAL ENCOUNTER (OUTPATIENT)
Dept: ULTRASOUND IMAGING | Age: 42
Discharge: HOME | End: 2017-08-10
Payer: OTHER GOVERNMENT

## 2017-08-10 PROCEDURE — 76705 ECHO EXAM OF ABDOMEN: CPT

## 2017-08-10 PROCEDURE — 76830 TRANSVAGINAL US NON-OB: CPT
